# Patient Record
Sex: MALE | Race: WHITE | HISPANIC OR LATINO | Employment: FULL TIME | URBAN - METROPOLITAN AREA
[De-identification: names, ages, dates, MRNs, and addresses within clinical notes are randomized per-mention and may not be internally consistent; named-entity substitution may affect disease eponyms.]

---

## 2023-10-24 ENCOUNTER — APPOINTMENT (OUTPATIENT)
Dept: RADIOLOGY | Facility: MEDICAL CENTER | Age: 38
End: 2023-10-24
Attending: EMERGENCY MEDICINE

## 2023-10-24 ENCOUNTER — APPOINTMENT (OUTPATIENT)
Dept: RADIOLOGY | Facility: MEDICAL CENTER | Age: 38
End: 2023-10-24

## 2023-10-24 ENCOUNTER — HOSPITAL ENCOUNTER (EMERGENCY)
Facility: MEDICAL CENTER | Age: 38
End: 2023-10-24
Attending: EMERGENCY MEDICINE

## 2023-10-24 VITALS
BODY MASS INDEX: 30.56 KG/M2 | OXYGEN SATURATION: 98 % | HEIGHT: 74 IN | DIASTOLIC BLOOD PRESSURE: 101 MMHG | RESPIRATION RATE: 18 BRPM | TEMPERATURE: 98.9 F | SYSTOLIC BLOOD PRESSURE: 142 MMHG | WEIGHT: 238.1 LBS | HEART RATE: 74 BPM

## 2023-10-24 DIAGNOSIS — M71.20 SYNOVIAL CYST OF POPLITEAL SPACE, UNSPECIFIED LATERALITY: ICD-10-CM

## 2023-10-24 DIAGNOSIS — M25.562 LEFT KNEE PAIN, UNSPECIFIED CHRONICITY: ICD-10-CM

## 2023-10-24 PROCEDURE — 99283 EMERGENCY DEPT VISIT LOW MDM: CPT

## 2023-10-24 PROCEDURE — 93971 EXTREMITY STUDY: CPT | Mod: LT

## 2023-10-24 PROCEDURE — 73564 X-RAY EXAM KNEE 4 OR MORE: CPT | Mod: LT

## 2023-10-24 PROCEDURE — 93971 EXTREMITY STUDY: CPT | Mod: 26,LT | Performed by: INTERNAL MEDICINE

## 2023-10-24 RX ORDER — IBUPROFEN 800 MG/1
800 TABLET ORAL EVERY 8 HOURS PRN
Qty: 15 TABLET | Refills: 0 | Status: SHIPPED | OUTPATIENT
Start: 2023-10-24

## 2023-10-24 RX ORDER — CYCLOBENZAPRINE HCL 10 MG
10 TABLET ORAL 3 TIMES DAILY PRN
Qty: 15 TABLET | Refills: 0 | Status: SHIPPED | OUTPATIENT
Start: 2023-10-24

## 2023-10-24 NOTE — ED NOTES
Taken patient from triage waiting room, via WC alert/ oriented x 4.Verified patient identification.  Assumed patient care.   Placed on patient room.   Given the call light and instructed to call for any assistance needed/ or concerns.   Bed on lowest position, side rails up, breaks locked. Awaiting for ERP.

## 2023-10-24 NOTE — ED PROVIDER NOTES
"ED Provider Note    CHIEF COMPLAINT  Chief Complaint   Patient presents with    Knee Pain     Pt reports left knee pain for the past week and not getting better. Pt cannot remember one incident, pt explains pain to be in front and back of knee.        EXTERNAL RECORDS REVIEWED  None    HPI/ROS  LIMITATION TO HISTORY   Select: : None  OUTSIDE HISTORIAN(S):  None    Butch Osullivan is a 37 y.o. male who presents here for evaluation of knee pain.  Patient states he has had left knee pain for the past week or so, and states that it is \"not getting any better.\"  Patient states that he feels as though he may have gotten up in the middle the night to go and see his 8-month-old child, and planted and twisted wrong.  He does not recall any other events, denies any fall or trauma.  He has no shortness of breath, chest pain, or abdominal pain.  He denies any history of the same.  He has not been taking anything prior to arrival for the same.    PAST MEDICAL HISTORY   No bleeding disorders    SURGICAL HISTORY  patient denies any surgical history    FAMILY HISTORY  History reviewed. No pertinent family history.    SOCIAL HISTORY  Social History     Tobacco Use    Smoking status: Every Day     Types: Cigarettes    Smokeless tobacco: Never   Substance and Sexual Activity    Alcohol use: Yes    Drug use: Never    Sexual activity: Not on file       CURRENT MEDICATIONS  Home Medications       Reviewed by Vinod Cabello R.N. (Registered Nurse) on 10/24/23 at 1140  Med List Status: Not Addressed     Medication Last Dose Status        Patient Kevin Taking any Medications                           ALLERGIES  No Known Allergies    PHYSICAL EXAM  VITAL SIGNS: BP (!) 163/108   Pulse (!) 112   Temp 36.6 °C (97.8 °F) (Temporal)   Resp 18   Ht 1.88 m (6' 2\")   Wt 108 kg (238 lb 1.6 oz)   SpO2 97%   BMI 30.57 kg/m²    Constitutional: Well developed, well nourished. No acute distress.  HEENT: Normocephalic, atraumatic. Posterior pharynx " clear and moist.  Eyes:  EOMI. Normal sclera.  Back: No CVA tenderness, nontender midline, no step offs.  Musculoskeletal: No deformity, no edema, neurovascular intact.  Left lower extremity; tenderness to the lateral aspect of the knee, popliteal fossa tenderness, and proximal calf tenderness.  Neurovascular tact distally.  Nontender left ankle, nontender left hip.  Neuro: Clear speech, appropriate, cooperative, cranial nerves II-XII grossly intact.  Psych: Normal mood and affect      DIAGNOSTIC STUDIES / PROCEDURES  none    RADIOLOGY  US-EXTREMITY VENOUS LOWER UNILAT LEFT         DX-KNEE COMPLETE 4+ LEFT   Final Result      1.  No bony abnormality.   2.  Possible suprapatellar knee joint effusion.            COURSE & MEDICAL DECISION MAKING    This is a 37-year-old male here for evaluation of knee pain.  Patient has no acute finding on ultrasound, nor x-ray.  He states that he planted and twisted his leg, and we placed an Ace wrap, given anti-inflammatories.  He will follow-up as outpatient, or return for any further issues or concerns.    INITIAL ASSESSMENT, COURSE AND PLAN  Care Narrative: See above    DISPOSITION AND DISCUSSIONS  I have discussed management of the patient with the following physicians and SILVANA's: None    Discussion of management with other Q[facility] or appropriate source(s): None    Escalation of care considered, and ultimately not performed: Additional imaging.  CT scan not indicated at this time based on no trauma.    Barriers to care at this time, including but not limited to: Patient does not have established PCP.     Decision tools and prescription drugs considered including, but not limited to: None.    FINAL DIAGNOSIS  1. Left knee pain, unspecified chronicity    2. Synovial cyst of popliteal space, unspecified laterality           Electronically signed by: Clem Copeland D.O., 10/24/2023 1:23 PM

## 2023-10-24 NOTE — ED TRIAGE NOTES
Chief Complaint   Patient presents with    Knee Pain     Pt reports left knee pain for the past week and not getting better. Pt cannot remember one incident, pt explains pain to be in front and back of knee.      Explained to pt triage process, made pt aware to tell this RN/staff of any changes/concerns, pt verbalized understanding of process and instructions given. Pt to ER lobby.

## 2023-10-24 NOTE — ED NOTES
Discharge teaching and paperwork provided regarding knee pain and bakers cyst and all questions/concerns answered. VSS,  assessment stable. Given information regarding home care and reasons to follow up with ED or primary MD. Patient provided flexeril and ibuprofen Rx. Patient discharged to the care of self and ambulated out of the ED.